# Patient Record
(demographics unavailable — no encounter records)

---

## 2025-04-24 NOTE — DISCUSSION/SUMMARY
[de-identified] : ULTRASOUND SHOULDER PERFORMED , EVALUATED, DOCUMENTED - AND REVIEWED WITH PATIENT EVALUATION  REVEALS INFLAMMATORY CHANGES WITHOUT SIGNIFICANT TENDON TEAR  PATIENT HAS ELECTED TO PROCEED WITH KENALOG INJECTION SHOULDER RISKS AND BENEFITS DISCUSSED - VERBAL CONSENT OBTAINED SEE PROCEDURE NOTE     POST INJECTION INSTRUCTIONS:   INJECTION THERAPY HANDOUT PROVIDED   COLD THERAPY , -600 TID  PRN   HOME  EXERCISES QD -  FROZEN SHOULDER WITH PULLEY    START P.T.  WITHIN 2 WEEKS AFTER INJECTION - 2 X 4 WEEKS - PROGRESS TO HOME EXERCISES   CONSIDER REPEAT INJECTION AFTER P.T.    MRI IF NO RELIEF 12 WEEKS

## 2025-04-24 NOTE — HISTORY OF PRESENT ILLNESS
[de-identified] : PATIENT COMPLAINS OF RIGHT SHOUDLER  WHAT IS YOUR DOMINANT HAND- [facility] PAIN BEGAN FEBRUARY 2025 - 6-8 WEEKS PAIN  2-4/10 , AGGRAVATING FACTORS:  OVER HEADLIFTING ANY SYMPTOMS:  LIMITED RANGE OF MOTION SHARP PRIOR TREATMENT: REST RADIATES DOWN ARM    HAS HAD PREVIOUS IMAGING - NO HAS HAD PHYSICAL THERAPY WITHOUT RELIEF- NO HAS HAD PREVIOUS SURGERY-  NO HAS HAD PREVIOUS INJECTION .- NO

## 2025-04-24 NOTE — PHYSICAL EXAM
[de-identified] : PHYSICAL EXAM  RIGHT  SHOULDER  NECK EXAM  FROM NONTENDER  SPURLING  RIGHT=NEG, LEFT=NEG  NORMAL POSTURE AROM 115 / 115 / 75 / 20 TENDER: SA REGION POSTERIOR  - DOWN ARM   SPECIAL TESTING : SCHUMACHER - POSITIVE  FAUSTO - POSITIVE  SPEED TEST - POSITIVE  DREW - NEGATIVE  APPREHENSION AND SUPPRESSION - NEGATIVE   RC STRENGTH TESTING  SS:  5/5 SUB 5/5 IS     5/5 BICEPS  5/5  SENSATION  - GROSSLY INTACT   [de-identified] : I ORDERED XRAYS OF SHOULDER TO EVALUATE PAINFUL SYMPTOMS  RIGHT SHOULDER XRAY (2 VIEWS - AP AND OUTLET) -  NO OBVIOUS FRACTURE ,  SEPARATION OR DISLOCATION NO SIGNIFICANT OSTEOARTHRITIS, TYPE 3B ACROMION CSA= 34

## 2025-04-24 NOTE — PROCEDURE
[de-identified] : INJECTION RIGHT SHOULDER GH JOINT AND SA SPACE   Patient has demonstrated limited relief from NSAIDS, rest, exercises / PT, and after discussion of the risks and benefits, the patient has elected to proceed with an ULTRASOUND GUIDED injection into the RIGHT GLENOHUMERAL JOINT AND SA SPACE    Confirmed that the patient does not have history of prior adverse reactions, active, infections, or relevant allergies. There was no effusion, erythema, or warmth, and the skin was clear  The skin was sterilized with alcohol. Ethyl Chloride was used as a topical anesthetic. Routine sterile technique.  The site was injected UTILIZING ULTRASOUND GUIDANCE to confirm appropriate placement of the needle- with a mixture of medication and local anesthetic. The injection was completed without complication and a bandage was applied.   The patient tolerated the procedure well and was given post-injection instructions.Rec: Cold therapy, analgesics, avoid heavy activity. MEDICATION: 4cc of 1% xylocaine + 20mg of KENALOG EACH SITE